# Patient Record
Sex: MALE | Race: WHITE | NOT HISPANIC OR LATINO
[De-identification: names, ages, dates, MRNs, and addresses within clinical notes are randomized per-mention and may not be internally consistent; named-entity substitution may affect disease eponyms.]

---

## 2017-11-21 PROBLEM — Z00.00 ENCOUNTER FOR PREVENTIVE HEALTH EXAMINATION: Status: ACTIVE | Noted: 2017-11-21

## 2017-11-28 ENCOUNTER — APPOINTMENT (OUTPATIENT)
Dept: ORTHOPEDIC SURGERY | Facility: CLINIC | Age: 37
End: 2017-11-28
Payer: COMMERCIAL

## 2017-11-28 VITALS
BODY MASS INDEX: 29.59 KG/M2 | WEIGHT: 238 LBS | SYSTOLIC BLOOD PRESSURE: 133 MMHG | HEART RATE: 62 BPM | DIASTOLIC BLOOD PRESSURE: 88 MMHG | HEIGHT: 75 IN

## 2017-11-28 DIAGNOSIS — Z87.39 PERSONAL HISTORY OF OTHER DISEASES OF THE MUSCULOSKELETAL SYSTEM AND CONNECTIVE TISSUE: ICD-10-CM

## 2017-11-28 DIAGNOSIS — Z87.898 PERSONAL HISTORY OF OTHER SPECIFIED CONDITIONS: ICD-10-CM

## 2017-11-28 DIAGNOSIS — Z60.2 PROBLEMS RELATED TO LIVING ALONE: ICD-10-CM

## 2017-11-28 DIAGNOSIS — M25.561 PAIN IN RIGHT KNEE: ICD-10-CM

## 2017-11-28 DIAGNOSIS — Z80.9 FAMILY HISTORY OF MALIGNANT NEOPLASM, UNSPECIFIED: ICD-10-CM

## 2017-11-28 PROCEDURE — 99204 OFFICE O/P NEW MOD 45 MIN: CPT

## 2017-11-28 PROCEDURE — 73564 X-RAY EXAM KNEE 4 OR MORE: CPT | Mod: RT

## 2017-11-28 SDOH — SOCIAL STABILITY - SOCIAL INSECURITY: PROBLEMS RELATED TO LIVING ALONE: Z60.2

## 2017-11-29 PROBLEM — Z87.39 HISTORY OF JOINT SWELLING: Status: RESOLVED | Noted: 2017-11-28 | Resolved: 2017-11-29

## 2017-11-29 PROBLEM — Z87.39 HISTORY OF JOINT STIFFNESS: Status: RESOLVED | Noted: 2017-11-28 | Resolved: 2017-11-29

## 2017-11-29 PROBLEM — M25.561 RIGHT KNEE PAIN: Status: ACTIVE | Noted: 2017-11-28

## 2017-11-29 PROBLEM — Z87.898 HISTORY OF WEAKNESS: Status: RESOLVED | Noted: 2017-11-28 | Resolved: 2017-11-29

## 2017-11-29 PROBLEM — Z87.39 HISTORY OF HERNIATED INTERVERTEBRAL DISC: Status: RESOLVED | Noted: 2017-11-28 | Resolved: 2017-11-29

## 2017-11-29 PROBLEM — Z80.9 FAMILY HISTORY OF MALIGNANT NEOPLASM: Status: ACTIVE | Noted: 2017-11-28

## 2017-11-29 RX ORDER — MULTIVITAMIN
TABLET ORAL
Refills: 0 | Status: ACTIVE | COMMUNITY

## 2017-11-29 RX ORDER — IBUPROFEN 800 MG
TABLET ORAL
Refills: 0 | Status: ACTIVE | COMMUNITY

## 2017-12-05 ENCOUNTER — MESSAGE (OUTPATIENT)
Age: 37
End: 2017-12-05

## 2017-12-19 ENCOUNTER — RESULT REVIEW (OUTPATIENT)
Age: 37
End: 2017-12-19

## 2017-12-29 ENCOUNTER — MESSAGE (OUTPATIENT)
Age: 37
End: 2017-12-29

## 2018-01-04 NOTE — ASU PATIENT PROFILE, ADULT - TEACHING/LEARNING LEARNING PREFERENCES
verbal instruction/individual instruction/audio individual instruction/verbal instruction/written material/audio/skill demonstration

## 2018-01-05 ENCOUNTER — APPOINTMENT (OUTPATIENT)
Dept: ORTHOPEDIC SURGERY | Facility: HOSPITAL | Age: 38
End: 2018-01-05

## 2018-01-05 ENCOUNTER — OUTPATIENT (OUTPATIENT)
Dept: OUTPATIENT SERVICES | Facility: HOSPITAL | Age: 38
LOS: 1 days | Discharge: HOME | End: 2018-01-05
Payer: COMMERCIAL

## 2018-01-05 ENCOUNTER — RESULT REVIEW (OUTPATIENT)
Age: 38
End: 2018-01-05

## 2018-01-05 VITALS
RESPIRATION RATE: 20 BRPM | DIASTOLIC BLOOD PRESSURE: 95 MMHG | OXYGEN SATURATION: 100 % | SYSTOLIC BLOOD PRESSURE: 138 MMHG | HEART RATE: 64 BPM | WEIGHT: 245.59 LBS | HEIGHT: 75 IN | TEMPERATURE: 98 F

## 2018-01-05 VITALS
SYSTOLIC BLOOD PRESSURE: 114 MMHG | DIASTOLIC BLOOD PRESSURE: 62 MMHG | TEMPERATURE: 97 F | HEART RATE: 68 BPM | OXYGEN SATURATION: 97 % | RESPIRATION RATE: 20 BRPM

## 2018-01-05 DIAGNOSIS — S83.231A COMPLEX TEAR OF MEDIAL MENISCUS, CURRENT INJURY, RIGHT KNEE, INITIAL ENCOUNTER: ICD-10-CM

## 2018-01-05 DIAGNOSIS — F41.1 GENERALIZED ANXIETY DISORDER: ICD-10-CM

## 2018-01-05 DIAGNOSIS — Z98.890 OTHER SPECIFIED POSTPROCEDURAL STATES: Chronic | ICD-10-CM

## 2018-01-05 DIAGNOSIS — S83.511A SPRAIN OF ANTERIOR CRUCIATE LIGAMENT OF RIGHT KNEE, INITIAL ENCOUNTER: ICD-10-CM

## 2018-01-05 DIAGNOSIS — J30.89 OTHER ALLERGIC RHINITIS: ICD-10-CM

## 2018-01-05 PROCEDURE — 29999 UNLISTED PX ARTHROSCOPY: CPT

## 2018-01-05 PROCEDURE — 29879 ARTHRS KNE SRG ABRASJ ARTHRP: CPT | Mod: RT

## 2018-01-05 PROCEDURE — 29881 ARTHRS KNE SRG MNISECTMY M/L: CPT | Mod: RT

## 2018-01-05 PROCEDURE — 29888 ARTHRS AID ACL RPR/AGMNTJ: CPT | Mod: RT

## 2018-01-05 PROCEDURE — 88304 TISSUE EXAM BY PATHOLOGIST: CPT

## 2018-01-05 RX ORDER — SODIUM CHLORIDE 9 MG/ML
1000 INJECTION, SOLUTION INTRAVENOUS
Qty: 0 | Refills: 0 | Status: DISCONTINUED | OUTPATIENT
Start: 2018-01-05 | End: 2018-01-05

## 2018-01-05 RX ORDER — ONDANSETRON 8 MG/1
4 TABLET, FILM COATED ORAL ONCE
Qty: 0 | Refills: 0 | Status: DISCONTINUED | OUTPATIENT
Start: 2018-01-05 | End: 2018-01-05

## 2018-01-05 RX ORDER — OXYCODONE AND ACETAMINOPHEN 5; 325 MG/1; MG/1
1 TABLET ORAL EVERY 4 HOURS
Qty: 0 | Refills: 0 | Status: DISCONTINUED | OUTPATIENT
Start: 2018-01-05 | End: 2018-01-05

## 2018-01-05 RX ORDER — OXYCODONE AND ACETAMINOPHEN 5; 325 MG/1; MG/1
2 TABLET ORAL EVERY 4 HOURS
Qty: 0 | Refills: 0 | Status: DISCONTINUED | OUTPATIENT
Start: 2018-01-05 | End: 2018-01-05

## 2018-01-05 RX ORDER — NABUMETONE 750 MG
1 TABLET ORAL
Qty: 14 | Refills: 0 | OUTPATIENT
Start: 2018-01-05 | End: 2018-01-11

## 2018-01-05 RX ORDER — MORPHINE SULFATE 50 MG/1
4 CAPSULE, EXTENDED RELEASE ORAL
Qty: 0 | Refills: 0 | Status: DISCONTINUED | OUTPATIENT
Start: 2018-01-05 | End: 2018-01-05

## 2018-01-05 NOTE — BRIEF OPERATIVE NOTE - PROCEDURE
<<-----Click on this checkbox to enter Procedure Right knee arthroscopy  01/05/2018  partial medial meniscectomy,  chondroplasty,  microfracture, ACL thermal shrinkage  Active  JOHNATHAN

## 2018-01-05 NOTE — CONSULT NOTE ADULT - SUBJECTIVE AND OBJECTIVE BOX
HPI:  37 year old male with complex tear of medial meniscus right knee and right ACL tear with moderate right knee pain and instability.  MRI confirms diagnosis.  Symptoms worse with twisting motions and stairs and exercise and persist over time.  Patient s/p right knee arthroscopy.    PAST MEDICAL & SURGICAL HISTORY:  allergic rhinits  Denies DM HBP PUD ASTHMA  History of surgery: right knee scope      REVIEW OF SYSTEMS    General:  malaise	  Skin/Breast: normal  Ophthalmologic: negative  ENMT:	normal  Respiratory and Thorax: normal  Cardiovascular:	normal  Gastrointestinal:	normal  Genitourinary:	normal  Musculoskeletal:	swelling   Neurological:	normal  Psychiatric:	normal  Hematology/Lymphatics:	 negative  Endocrine:	negative  Allergic/Immunologic:	negative      MEDICATIONS     none     Allergies    No Known Allergies    SOCIAL HISTORY: no cigs social alcohol    FAMILY HISTORY: non contributory      PHYSICAL EXAM:  Daily Height in cm: 190.5 (05 Jan 2018 07:29)      Vital Signs Last 24 Hrs  T(C): 36.7 (05 Jan 2018 07:29), Max: 36.7 (05 Jan 2018 07:29)  T(F): 98.1 (05 Jan 2018 07:29), Max: 98.1 (05 Jan 2018 07:29)  HR: 64 (05 Jan 2018 07:29) (64 - 64)  BP: 138/95 (05 Jan 2018 07:29) (138/95 - 138/95)  BP(mean): --  RR: 20 (05 Jan 2018 07:29) (20 - 20)  SpO2: 100% (05 Jan 2018 07:29) (100% - 100%)    Constitutional: WDWNM in NAD  Eyes: conj pink  ENMT: negative  Neck: supple  Breasts: not examined   Back: negative  Respiratory: clear to P&A  Cardiovascular: no MRGT or H  Gastrointestinal: normal bowel sounds  Genitourinary: neg  Rectal: not examined  Extremities:  normal  Vascular: normal  Neurological: normal  Skin: negative  Lymph Nodes: negative  Musculoskeletal:   decreased ROM  right knee and + lachman  Psychiatric: anxiety

## 2018-01-09 LAB — SURGICAL PATHOLOGY STUDY: SIGNIFICANT CHANGE UP

## 2018-01-30 ENCOUNTER — APPOINTMENT (OUTPATIENT)
Dept: ORTHOPEDIC SURGERY | Facility: CLINIC | Age: 38
End: 2018-01-30
Payer: COMMERCIAL

## 2018-01-30 VITALS — BODY MASS INDEX: 29.59 KG/M2 | HEIGHT: 75 IN | WEIGHT: 238 LBS

## 2018-01-30 DIAGNOSIS — M23.91 UNSPECIFIED INTERNAL DERANGEMENT OF RIGHT KNEE: ICD-10-CM

## 2018-01-30 PROCEDURE — 99024 POSTOP FOLLOW-UP VISIT: CPT

## 2018-03-13 ENCOUNTER — APPOINTMENT (OUTPATIENT)
Dept: ORTHOPEDIC SURGERY | Facility: CLINIC | Age: 38
End: 2018-03-13
Payer: COMMERCIAL

## 2018-03-13 VITALS — WEIGHT: 238 LBS | HEIGHT: 75 IN | BODY MASS INDEX: 29.59 KG/M2

## 2018-03-13 DIAGNOSIS — M23.8X1 OTHER INTERNAL DERANGEMENTS OF RIGHT KNEE: ICD-10-CM

## 2018-03-13 PROCEDURE — 99024 POSTOP FOLLOW-UP VISIT: CPT

## 2019-08-26 ENCOUNTER — EMERGENCY (EMERGENCY)
Facility: HOSPITAL | Age: 39
LOS: 1 days | Discharge: HOME | End: 2019-08-26
Admitting: EMERGENCY MEDICINE
Payer: COMMERCIAL

## 2019-08-26 VITALS
HEIGHT: 75 IN | RESPIRATION RATE: 18 BRPM | OXYGEN SATURATION: 98 % | TEMPERATURE: 98 F | WEIGHT: 259.7 LBS | HEART RATE: 83 BPM | SYSTOLIC BLOOD PRESSURE: 150 MMHG | DIASTOLIC BLOOD PRESSURE: 97 MMHG

## 2019-08-26 DIAGNOSIS — Z98.890 OTHER SPECIFIED POSTPROCEDURAL STATES: Chronic | ICD-10-CM

## 2019-08-26 PROCEDURE — 99283 EMERGENCY DEPT VISIT LOW MDM: CPT

## 2019-08-26 RX ORDER — CIPROFLOXACIN AND DEXAMETHASONE 3; 1 MG/ML; MG/ML
4 SUSPENSION/ DROPS AURICULAR (OTIC)
Qty: 1 | Refills: 0
Start: 2019-08-26 | End: 2019-09-01

## 2019-08-26 NOTE — ED PROVIDER NOTE - CARE PROVIDER_API CALL
Rona Tabor)  Otolaryngology  [address] [place] [address], [address]  [place], [place] [address]  Phone: [phone]  Fax: [phone]  Follow Up Time:

## 2019-08-26 NOTE — ED PROVIDER NOTE - NSFOLLOWUPINSTRUCTIONS_ED_ALL_ED_FT
Log Out.    [employer]® CareNotes®     :  [facility]             OTITIS EXTERNA - AfterCare(R) Instructions(ER/ED)     Otitis Externa    WHAT YOU NEED TO KNOW:    Otitis externa, or swimmer's ear, is an infection in the outer ear canal. This canal goes from the outside of the ear to the eardrum. Ear Anatomy         DISCHARGE INSTRUCTIONS:    Return to the emergency department if:     You have severe ear pain.      You are suddenly unable to hear at all.      You have new swelling in your face, behind your ears, or in your neck.      You suddenly cannot move part of your face.      Your face suddenly feels numb.    Contact your healthcare provider if:     You have a fever.       Your signs and symptoms do not get better after 2 days of treatment.       Your signs and symptoms go away for a time, but then come back.       You have questions or concerns about your condition or care.     Medicines:     NSAIDs, such as ibuprofen, help decrease swelling, pain, and fever. This medicine is available with or without a doctor's order. NSAIDs can cause stomach bleeding or kidney problems in certain people. If you take blood thinner medicine, always ask if NSAIDs are safe for you. Always read the medicine label and follow directions. Do not give these medicines to children under 6 months of age without direction from your child's healthcare provider.      Acetaminophen decreases pain and fever. It is available without a doctor's order. Ask how much to take and how often to take it. Follow directions. Acetaminophen can cause liver damage if not taken correctly.      Ear drops that contain an antibiotic may be given. The antibiotic helps treat a bacterial infection. You may also be given steroid medicine. The steroid helps decrease redness, swelling, and pain.       Take your medicine as directed. Contact your healthcare provider if you think your medicine is not helping or if you have side effects. Tell him or her if you are allergic to any medicine. Keep a list of the medicines, vitamins, and herbs you take. Include the amounts, and when and why you take them. Bring the list or the pill bottles to follow-up visits. Carry your medicine list with you in case of an emergency.    Follow up with your healthcare provider as directed: Write down your questions so you remember to ask them during your visits.    How to use eardrops:     Lie down on your side with your infected ear facing up.      Carefully drip the correct number of eardrops into your ear. Have another person help you if possible.      Gently move the outside part of your ear back and forth to help the medicine reach your ear canal.       Stay lying down in the same position (with your ear facing up) for 3 to 5 minutes.     Prevent otitis externa:     Do not put cotton swabs or foreign objects in your ears.      Wrap a clean moist washcloth around your finger, and use it to clean your outer ear and remove extra ear wax.       Use ear plugs when you swim. Dry your outer ears completely after you swim or bathe.         © Copyright [employer] 2019 All illustrations and images included in CareNotes are the copyrighted property of [employer] or [employer].      back to top                      © Copyright [employer] 2019 please use ear drops as prescribed    please have re-check of your ear in 2-3 days by ENT      please take ibuprofen for pain as needed    OTITIS EXTERNA - AfterCare(R) Instructions(ER/ED)     Otitis Externa    WHAT YOU NEED TO KNOW:    Otitis externa, or swimmer's ear, is an infection in the outer ear canal. This canal goes from the outside of the ear to the eardrum. Ear Anatomy         DISCHARGE INSTRUCTIONS:    Return to the emergency department if:     You have severe ear pain.      You are suddenly unable to hear at all.      You have new swelling in your face, behind your ears, or in your neck.      You suddenly cannot move part of your face.      Your face suddenly feels numb.    Contact your healthcare provider if:     You have a fever.       Your signs and symptoms do not get better after 2 days of treatment.       Your signs and symptoms go away for a time, but then come back.       You have questions or concerns about your condition or care.     Medicines:     NSAIDs, such as ibuprofen, help decrease swelling, pain, and fever. This medicine is available with or without a doctor's order. NSAIDs can cause stomach bleeding or kidney problems in certain people. If you take blood thinner medicine, always ask if NSAIDs are safe for you. Always read the medicine label and follow directions. Do not give these medicines to children under 6 months of age without direction from your child's healthcare provider.      Acetaminophen decreases pain and fever. It is available without a doctor's order. Ask how much to take and how often to take it. Follow directions. Acetaminophen can cause liver damage if not taken correctly.      Ear drops that contain an antibiotic may be given. The antibiotic helps treat a bacterial infection. You may also be given steroid medicine. The steroid helps decrease redness, swelling, and pain.       Take your medicine as directed. Contact your healthcare provider if you think your medicine is not helping or if you have side effects. Tell him or her if you are allergic to any medicine. Keep a list of the medicines, vitamins, and herbs you take. Include the amounts, and when and why you take them. Bring the list or the pill bottles to follow-up visits. Carry your medicine list with you in case of an emergency.    Follow up with your healthcare provider as directed: Write down your questions so you remember to ask them during your visits.    How to use eardrops:     Lie down on your side with your infected ear facing up.      Carefully drip the correct number of eardrops into your ear. Have another person help you if possible.      Gently move the outside part of your ear back and forth to help the medicine reach your ear canal.       Stay lying down in the same position (with your ear facing up) for 3 to 5 minutes.     Prevent otitis externa:     Do not put cotton swabs or foreign objects in your ears.      Wrap a clean moist washcloth around your finger, and use it to clean your outer ear and remove extra ear wax.       Use ear plugs when you swim. Dry your outer ears completely after you swim or bathe.         © Copyright [employer] 2019 All illustrations and images included in CareNotes are the copyrighted property of [employer] or [employer].      back to top                      © Copyright [employer] 2019

## 2019-08-26 NOTE — ED ADULT NURSE NOTE - OBJECTIVE STATEMENT
Pt presents to ED with c/o L ear pain, redness, swelling with "muffled hearing" and clear drainage x1 day. Denies fever/chills.

## 2019-08-26 NOTE — ED PROVIDER NOTE - PHYSICAL EXAMINATION
General survey: Patient is well developed and well nourished. Patient is lying in stretcher, not diaphoretic and does not appear in acute distress.    HEENT: left eac severely edematous, erythematous with clear discharge, unable to visualize TM. Pupils equal, round and reactive to light and accommodation. Extra ocular movements intact. No evidence of nystagmus, conjunctival injection or jaundice. Nose symmetric, non-tender without discharge. Nares muscosa moist without evidence of erythema. Moist mucous membranes of oropharyx. No evidence of erythema, edema, petichiae, exudates or tonsillar enlargement. Oropharynx moist mucous membranes. Teeth in good repair. Uvula midline. Posterior oropharynx without erythema, edema, tonsillar enlargement or exudates. Neck supple without evidence of lymphadenopathy    Skin: Warm dry and intact. No note of any edema, pallor, jaundice, erythema, ecchymosis, or purpura    Psych: Mood and affect appropriate

## 2019-08-26 NOTE — ED PROVIDER NOTE - CLINICAL SUMMARY MEDICAL DECISION MAKING FREE TEXT BOX
This is a pleasant 38 year old male presenting to the ed with left ear swelling and decreased hearing over the past 4 days with significant worsening over the psat 24 hours without fevers or chills. physical exam patient appears well, non-toxic. left eac edematous and ertyhematous and occluded. no mastoid or tragal ttp. ent consulted. This is a pleasant 38 year old male presenting to the ed with left ear swelling and decreased hearing over the past 4 days with significant worsening over the psat 24 hours without fevers or chills. physical exam patient appears well, non-toxic. left eac edematous and erythematous and occluded. no mastoid or tragal ttp. ent consulted who placed ear wick, encouraged to use ciprodex and follow up in 2 days for removal and re-check by ENT. pt agreeable to plan. ED evaluation and management discussed with the patient and family (if available) in detail.  Close PMD follow up encouraged.  Strict ED return instructions discussed in detail and patient given the opportunity to ask any questions about their discharge diagnosis and instructions. Patient verbalized understanding. Patient is agreeable to plan.

## 2019-08-26 NOTE — ED ADULT NURSE NOTE - NSIMPLEMENTINTERV_GEN_ALL_ED
Implemented All Universal Safety Interventions:  [place] to call system. Call bell, personal items and telephone within reach. Instruct patient to call for assistance. Room bathroom lighting operational. Non-slip footwear when patient is off stretcher. Physically safe environment: no spills, clutter or unnecessary equipment. Stretcher in lowest position, wheels locked, appropriate side rails in place.

## 2019-08-26 NOTE — ED ADULT TRIAGE NOTE - CHIEF COMPLAINT QUOTE
c/o left earache with clear drain since Sunday with decreased hearing. Denies fever, chills, headache

## 2019-08-26 NOTE — ED PROVIDER NOTE - OBJECTIVE STATEMENT
states that it began over the past 3-4 days. no fevers or chillss. states that earlier today he noticed "the ear popping and some clear discharge". no pain, described as a 2. has not been taking any medication. no injury to the ear, use of q tips or recent swimmiung. states that the ear feels "swollen and I cant hear as well". no nasal congestion discharge or sore throat. states that it began over the past 3-4 days. no fevers or chillss. states that earlier today he noticed "the ear popping and some clear discharge". no pain, described as a 2. has not been taking any medication. no injury to the ear, use of q tips or recent swimming. states that the ear feels "swollen and I cant hear as well". no nasal congestion discharge or sore throat.

## 2019-08-28 ENCOUNTER — APPOINTMENT (OUTPATIENT)
Dept: OTOLARYNGOLOGY | Facility: CLINIC | Age: 39
End: 2019-08-28
Payer: COMMERCIAL

## 2019-08-28 VITALS
DIASTOLIC BLOOD PRESSURE: 89 MMHG | SYSTOLIC BLOOD PRESSURE: 131 MMHG | OXYGEN SATURATION: 98 % | HEIGHT: 75 IN | HEART RATE: 63 BPM | BODY MASS INDEX: 30.46 KG/M2 | WEIGHT: 245 LBS

## 2019-08-28 PROCEDURE — 69210 REMOVE IMPACTED EAR WAX UNI: CPT | Mod: RT

## 2019-08-28 PROCEDURE — 99204 OFFICE O/P NEW MOD 45 MIN: CPT | Mod: 25

## 2019-08-28 NOTE — ASSESSMENT
[FreeTextEntry1] : The patient is a 38 male who presents with concern for left-sided otalgia and otorrhea. History physical exam is consistent with otitis externa. Today the wick was removed and the ear was debrided. It is now back to baseline. I recommend continue use of Ciprodex solution for a total of 10 days. The patient will follow up with me in 2 weeks, sooner should symptoms worsen or failed to improve. Additionally the right ear was cleaned of cerumen.\par \par -Ciprodex otic solution to the left ear it for a total of 10 days\par -Followup in 2 weeks, sooner should symptoms worsen or failed to improve

## 2019-08-28 NOTE — PROCEDURE
[Risk and Benefits Discussed] : The purpose, risks, discomforts, benefits and alternatives of the procedure have been explained to the patient including no treatment. [Same] : same as the Pre Op Dx. [Other ___] : [unfilled] [] : Removal of Cerumen [FreeTextEntry4] : none [FreeTextEntry5] : left-sided ear debridement [FreeTextEntry6] : curette and suction was used to remove cerumen from the right EAC. The patient tolerated this well\par Curette and suction were used to remove a week as well as purulent debris from the left EAC. The patient tolerated this well.

## 2019-08-28 NOTE — REASON FOR VISIT
[Initial Consultation] : an initial consultation for [Ear Drainage] : ear drainage [Ear Pain] : ear pain

## 2019-08-28 NOTE — PHYSICAL EXAM
[Midline] : trachea located in midline position [Normal] : no rashes [de-identified] : left-sided ear wick was removed. There was evidence of purulent debris in the EAC. This was suctioned away. The EAC appear to be at baseline and the tympanic membrane intact.  the right ear had a mild amount of cerumen which was also removed, the ear was otherwise normal

## 2019-08-28 NOTE — HISTORY OF PRESENT ILLNESS
[de-identified] : the patient is a 38-year-old gentleman who presents with concern for left-sided otalgia and otorrhea.The patient states that his symptoms began one week ago. He progressively has gotten worse. He also noted some muffled hearing. He was ultimately seen that h emergency room on 826 2019. While there he was diagnosed with a left-sided otitis externa. A wick was placed in the EAC. The patient was started on Ciprodex otic solution. He has been using the drops.  He does not use Q-tips. He has not been in lakes or pools. nothing like this has happened previously.\par \par The patient otherwise denies any ear, nose, throat symptoms.

## 2019-08-30 DIAGNOSIS — H92.02 OTALGIA, LEFT EAR: ICD-10-CM

## 2019-08-30 DIAGNOSIS — H60.502 UNSPECIFIED ACUTE NONINFECTIVE OTITIS EXTERNA, LEFT EAR: ICD-10-CM

## 2019-09-11 ENCOUNTER — APPOINTMENT (OUTPATIENT)
Dept: OTOLARYNGOLOGY | Facility: CLINIC | Age: 39
End: 2019-09-11
Payer: COMMERCIAL

## 2019-09-11 VITALS
BODY MASS INDEX: 30.46 KG/M2 | HEART RATE: 88 BPM | HEIGHT: 75 IN | OXYGEN SATURATION: 98 % | SYSTOLIC BLOOD PRESSURE: 137 MMHG | WEIGHT: 245 LBS | DIASTOLIC BLOOD PRESSURE: 84 MMHG

## 2019-09-11 PROCEDURE — 99213 OFFICE O/P EST LOW 20 MIN: CPT | Mod: 25

## 2019-09-11 PROCEDURE — 69210 REMOVE IMPACTED EAR WAX UNI: CPT | Mod: RT

## 2019-09-11 NOTE — ASSESSMENT
[FreeTextEntry1] : The patient is a 38-year-old gentleman who is known to me for a left-sided otitis externa. He has done well with Ciprodex otic solution to the left ear. At this point he feels asymptomatic. Both ears were cleaned in the office and the patient is now back to his baseline. At this time I recommend followup as needed.\par \par -Follow up as needed

## 2019-09-11 NOTE — HISTORY OF PRESENT ILLNESS
[de-identified] : the patient is a 38-year-old gentleman who presents with concern for left-sided otalgia and otorrhea.The patient states that his symptoms began one week ago. He progressively has gotten worse. He also noted some muffled hearing. He was ultimately seen that h emergency room on 826 2019. While there he was diagnosed with a left-sided otitis externa. A wick was placed in the EAC. The patient was started on Ciprodex otic solution. He has been using the drops.  He does not use Q-tips. He has not been in lakes or pools. nothing like this has happened previously.\par \par The patient otherwise denies any ear, nose, throat symptoms. [FreeTextEntry1] : uptake 9/11/2019\par Since her last visit the patient has been doing remarkably well. He's been using Ciprodex otic solution on the left.  He no longer has any pain or itchiness in his ears. He feels that his hearing is back to baseline. He denies any otologic symptoms otherwise. He denies any new ear, nose, throat symptoms.

## 2019-09-11 NOTE — PHYSICAL EXAM
[Midline] : trachea located in midline position [Normal] : no rashes [de-identified] : evidence of somewhat cerumen on the right evidence of some residual macerated skin on the left, once cleaned away both EACs appeared normal and back to baseline

## 2019-10-02 PROBLEM — Z60.2 PERSON LIVING ALONE: Status: ACTIVE | Noted: 2017-11-28

## 2020-01-10 NOTE — ED ADULT NURSE NOTE - PAIN RATING/NUMBER SCALE (0-10): ACTIVITY
Anesthesia Transfer of Care Note    Patient: Jacques Thomson III    Procedure(s) Performed: Procedure(s) (LRB):  ADENOIDECTOMY (N/A)    Patient location: PACU    Anesthesia Type: general    Transport from OR: Transported from OR on room air with adequate spontaneous ventilation    Post pain: adequate analgesia    Post assessment: no apparent anesthetic complications and tolerated procedure well    Post vital signs: stable    Level of consciousness: awake, alert and oriented    Nausea/Vomiting: no nausea/vomiting    Complications: none    Transfer of care protocol was followed      Last vitals:   Visit Vitals  /71   Pulse 87   Temp 37.2 °C (99 °F) (Temporal)   Resp 16   Wt 105.2 kg (231 lb 14.8 oz)   SpO2 95%     
2

## 2020-07-10 ENCOUNTER — APPOINTMENT (OUTPATIENT)
Dept: OTOLARYNGOLOGY | Facility: CLINIC | Age: 40
End: 2020-07-10
Payer: COMMERCIAL

## 2020-07-10 VITALS
HEART RATE: 62 BPM | WEIGHT: 245 LBS | BODY MASS INDEX: 30.46 KG/M2 | DIASTOLIC BLOOD PRESSURE: 95 MMHG | HEIGHT: 75 IN | SYSTOLIC BLOOD PRESSURE: 134 MMHG

## 2020-07-10 DIAGNOSIS — H92.01 OTALGIA, RIGHT EAR: ICD-10-CM

## 2020-07-10 DIAGNOSIS — H61.21 IMPACTED CERUMEN, RIGHT EAR: ICD-10-CM

## 2020-07-10 PROCEDURE — 99213 OFFICE O/P EST LOW 20 MIN: CPT | Mod: 25

## 2020-07-10 PROCEDURE — 69210 REMOVE IMPACTED EAR WAX UNI: CPT | Mod: RT

## 2020-07-10 NOTE — HISTORY OF PRESENT ILLNESS
[de-identified] : 39m, previously seen by Dr. savage for OE and cerumen impaction, returns for routine follow up of cerumen. He denies any anne-marie changes, tinnitus, otalgia, otorrhea, vertigo. No other EN complaints. No pertinent FH/Sh.

## 2020-07-10 NOTE — ASSESSMENT
[FreeTextEntry1] : 39M who presents with cerumen impaction of the right ear, removed in office.\par \par Plan:\par - cerumen removed\par - f/u in 6 months

## 2020-11-01 DIAGNOSIS — Z01.818 ENCOUNTER FOR OTHER PREPROCEDURAL EXAMINATION: ICD-10-CM

## 2020-11-01 NOTE — ASU PATIENT PROFILE, ADULT - NSSUBSTANCEUSE_GEN_ALL_CORE_SD
Pt discharged. Discharge explained, verbalized understanding. IV removed, cath intact. Waiting on transport. Will continue to monitor.   caffeine

## 2020-11-03 ENCOUNTER — APPOINTMENT (OUTPATIENT)
Dept: DISASTER EMERGENCY | Facility: CLINIC | Age: 40
End: 2020-11-03

## 2020-11-04 LAB — SARS-COV-2 N GENE NPH QL NAA+PROBE: NOT DETECTED

## 2022-04-25 ENCOUNTER — APPOINTMENT (OUTPATIENT)
Dept: OTOLARYNGOLOGY | Facility: CLINIC | Age: 42
End: 2022-04-25
Payer: COMMERCIAL

## 2022-04-25 VITALS
SYSTOLIC BLOOD PRESSURE: 169 MMHG | HEIGHT: 75 IN | OXYGEN SATURATION: 98 % | TEMPERATURE: 97.3 F | HEART RATE: 86 BPM | DIASTOLIC BLOOD PRESSURE: 105 MMHG | BODY MASS INDEX: 30.46 KG/M2 | WEIGHT: 245 LBS

## 2022-04-25 DIAGNOSIS — H60.312 DIFFUSE OTITIS EXTERNA, LEFT EAR: ICD-10-CM

## 2022-04-25 PROCEDURE — 69210 REMOVE IMPACTED EAR WAX UNI: CPT

## 2022-04-25 PROCEDURE — 99214 OFFICE O/P EST MOD 30 MIN: CPT | Mod: 25

## 2022-04-25 RX ORDER — OFLOXACIN OTIC 3 MG/ML
0.3 SOLUTION AURICULAR (OTIC) TWICE DAILY
Qty: 1 | Refills: 3 | Status: ACTIVE | COMMUNITY
Start: 2022-04-25 | End: 1900-01-01

## 2022-04-25 RX ORDER — DEXAMETHASONE SODIUM PHOSPHATE 1 MG/ML
0.1 SOLUTION/ DROPS OPHTHALMIC TWICE DAILY
Qty: 2 | Refills: 2 | Status: ACTIVE | COMMUNITY
Start: 2022-04-25 | End: 1900-01-01

## 2022-04-25 RX ORDER — CIPROFLOXACIN AND DEXAMETHASONE 3; 1 MG/ML; MG/ML
0.3-0.1 SUSPENSION/ DROPS AURICULAR (OTIC)
Qty: 1 | Refills: 2 | Status: ACTIVE | COMMUNITY
Start: 2022-04-25 | End: 1900-01-01

## 2022-04-25 NOTE — PROCEDURE
[FreeTextEntry3] : -\par Cerumen Removal/Ear Cleaning for Otitis Externa\par Pre-operative Diagnosis: bilateral Cerumen Impaction\par Post-operative Diagnosis: Same\par Procedure:  Binocular microscopy with cerumen removal- 43592\par Procedure Details:  \par The patient was placed in the supine position.  The operating microscope was positioned.  I then placed the ear speculum in the EAC.  Cerumen was then removed using a mixture of otologic curettes, and suction.  The TM was noted to be intact. I then performed the procedure of the opposite ear in similar fashion.  The patient tolerated procedure well.\par \par Findings: \par Bilateral Ear Canal - normal\par Bilateral Tympanic Membrane - normal\par \par Recommendations: Debrox\par Complications: None\par \par

## 2022-04-25 NOTE — ASSESSMENT
[FreeTextEntry1] :   38-year-old male who presents for routine ear cleaning.  On examination today there was evidence of bilateral cerumen impaction which was cleaned away.  On the left side there was also some noted debris.  This was cleaned away as well.  I suspect there may be some level of otitis externa.  I am recommending steroid and antibiotic otic drops to the left side and follow-up in 10 days for repeat evaluation.  Otitis externa handout also given.\par \par -   Debrox to the right ear\par -  Ciprodex to the left ear x10 days\par -  follow-up after treatment, sooner should symptoms worsen or fail to improve

## 2022-04-25 NOTE — PHYSICAL EXAM
[de-identified] :   Bilateral cerumen noted, there is also some debris noted on the left. [Normal] : mucosa is normal [Midline] : trachea located in midline position

## 2022-04-25 NOTE — HISTORY OF PRESENT ILLNESS
[FreeTextEntry1] :  update 4/25/2022\par No significant change in symptoms.  He presents today for routine ear cleaning.  He notes some minor irritation on the left.  No new ear, nose, throat symptoms otherwise. [de-identified] : the patient is a 38-year-old gentleman who presents with concern for left-sided otalgia and otorrhea.The patient states that his symptoms began one week ago. He progressively has gotten worse. He also noted some muffled hearing. He was ultimately seen that h emergency room on 826 2019. While there he was diagnosed with a left-sided otitis externa. A wick was placed in the EAC. The patient was started on Ciprodex otic solution. He has been using the drops.  He does not use Q-tips. He has not been in lakes or pools. nothing like this has happened previously.\par \par The patient otherwise denies any ear, nose, throat symptoms.\par -\par uptake 9/11/2019\par Since her last visit the patient has been doing remarkably well. He's been using Ciprodex otic solution on the left.  He no longer has any pain or itchiness in his ears. He feels that his hearing is back to baseline. He denies any otologic symptoms otherwise. He denies any new ear, nose, throat symptoms.\par -

## 2022-11-25 NOTE — ASU PREOP CHECKLIST - DENTURES
Subjective   Patient ID: Berenice is a 35 year old male.    Chief Complaint   Patient presents with   • Office Visit   • Cough   • Sore Throat     For 3 days cough up thick yellow mucous. Denies fever. Today right eye with drainage.     HPI  This is a 35-year-old male coming in with complaints of a sore throat has had a cough with thick yellow mucus for the last 3 days, no fever, no chills, today also with some right eye discharge and drainage.  Patient is otherwise healthy.  Not immunocompromised  No prior history of pneumonias, no shortness of breath or difficulty breathing.  Otherwise healthy, no prior history of pneumonias.  The right eyes started bothering him only last night, notes that he was coughing excessively and could not get a good night sleep.  Denies any fever or chills, has had multiple ear infections in the past has seen some discharge from out of the left ear but did not make much of it.  History of ear infections and eardrum ruptures as a child.    History reviewed. No pertinent past medical history.    MEDICATIONS:  Current Outpatient Medications   Medication Sig   • amoxicillin-clavulanate (AUGMENTIN) 875-125 MG per tablet Take 1 tablet by mouth in the morning and 1 tablet in the evening. Do all this for 10 days.   • ciprofloxacin (Ciloxan) 0.3 % ophthalmic solution Place 2 drops into right eye every 8 hours for 5 days.   • guaiFENesin-codeine (GUAIFENESIN AC) 100-10 MG/5ML liquid Take 10 mLs by mouth nightly as needed for Cough.     No current facility-administered medications for this visit.       ALLERGIES:  ALLERGIES:   Allergen Reactions   • Aleve RASH   • Shellfish Allergy   (Food Or Med) RASH       PAST SURGICAL HISTORY:  History reviewed. No pertinent surgical history.    FAMILY HISTORY:  History reviewed. No pertinent family history.    SOCIAL HISTORY:  Social History     Tobacco Use   • Smoking status: Never Smoker   • Smokeless tobacco: Never Used         Patient's medications,  allergies, past medical, surgical, and social history  were reviewed and updated as appropriate.    A 10 point review of systems was performed all of which are negative except as mentioned in the HPI    Objective   Physical Exam  Visit Vitals  /70   Pulse 91   Temp 97 °F (36.1 °C)   Resp 16   SpO2 99%       GENERAL: well developed, well nourished,in no apparent distress  SKIN: no rashes,no suspicious lesions  Eyes: Conjunctiva of the right eye is erythematous, pupils equally reactive to light bilaterally, extraocular moments intact bilaterally, discharge noted, mattering of eyelashes noted  HEENT: atraumatic, normocephalic,throat are clear, left ear-?  Perforation, there is a lot of discharge within the ear canal with weeping noted  NECK: supple, mild anterior cervical lymphadenopathy noted bilaterally  LUNGS: clear to auscultation, good respiratory effort   CARDIO: RRR without murmur  GI: not distended   NEURO: Alert and oriented to person, place and time   EXTREMITIES: no cyanosis, clubbing or edema          Assessment   Problem List Items Addressed This Visit    None     Visit Diagnoses     Left otitis media with spontaneous rupture of eardrum    -  Primary    Relevant Medications    amoxicillin-clavulanate (AUGMENTIN) 875-125 MG per tablet    Sore throat        Relevant Orders    POCT RAPID STREP A (Completed)    POCT SARS-COV-2 ANTIGEN/FLU ANTIGEN PANEL (Completed)    Acute bacterial conjunctivitis of right eye        Relevant Medications    ciprofloxacin (Ciloxan) 0.3 % ophthalmic solution    Post-nasal drip        Acute cough        Relevant Medications    guaiFENesin-codeine (GUAIFENESIN AC) 100-10 MG/5ML liquid      And has no signs of toxicity, decompensation or dehydration at this time.  Stable at the time of discharge.    This is a 35 year old year-old male who presents with diagnoses as above.  Plan of care as discussed below      Both rapid strep and viral panel were negative     Tylenol 1 g  every 6-8 hours as needed for pain  Or  Motrin 600 mg every 6 hours as needed for pain   Rx Augmentin 875 mg twice daily for the next 10 days  Probiotic-over-the-counter Culturelle/Florastor recommended to avoid any GI distress from the antibiotic     Ciprofloxacin eyedrops 2 drops every 8 hours into the affected eye complete the course for 5 days.  Avoid wearing contact lenses or rubbing the eye.    Instructions provided as documented in the AVS.    Thank you for visiting [facility].  Please follow up with your PCP In the next 3 to 5 days as needed.   no

## 2023-07-13 ENCOUNTER — APPOINTMENT (OUTPATIENT)
Dept: OTOLARYNGOLOGY | Facility: CLINIC | Age: 43
End: 2023-07-13
Payer: COMMERCIAL

## 2023-07-13 VITALS
HEART RATE: 101 BPM | SYSTOLIC BLOOD PRESSURE: 153 MMHG | HEIGHT: 75 IN | TEMPERATURE: 97.7 F | BODY MASS INDEX: 29.84 KG/M2 | WEIGHT: 240 LBS | DIASTOLIC BLOOD PRESSURE: 98 MMHG

## 2023-07-13 PROCEDURE — 99214 OFFICE O/P EST MOD 30 MIN: CPT

## 2023-07-13 NOTE — HISTORY OF PRESENT ILLNESS
[de-identified] : \par The patient is a 38-year-old gentleman who presents with concern for left-sided otalgia and otorrhea.The patient states that his symptoms began one week ago. He progressively has gotten worse. He also noted some muffled hearing. He was ultimately seen that h emergency room on 826 2019. While there he was diagnosed with a left-sided otitis externa. A wick was placed in the EAC. The patient was started on Ciprodex otic solution. He has been using the drops. He does not use Q-tips. He has not been in lakes or pools. nothing like this has happened previously.\par \par The patient otherwise denies any ear, nose, throat symptoms.\par -\par uptake 9/11/2019\par Since her last visit the patient has been doing remarkably well. He's been using Ciprodex otic solution on the left. He no longer has any pain or itchiness in his ears. He feels that his hearing is back to baseline. He denies any otologic symptoms otherwise. He denies any new ear, nose, throat symptoms.\par - \par Interval History: update 4/25/2022\par No significant change in symptoms. He presents today for routine ear cleaning. He notes some minor irritation on the left. No new ear, nose, throat symptoms otherwise. \par  \par - [FreeTextEntry1] : 7/13/23\par Went to an Urgent Care in [facility] after having some ear fullness.  He was dx with OM, and given abx and steroid.  After treatment symptoms persisted.  Seen by an ENT and given medrol/amoxcillin for 7 days and now feels better.  Had an audio on a repeat

## 2023-07-13 NOTE — ASSESSMENT
[FreeTextEntry1] : 42-year-old gentleman who is well-known to me for ear issues in the past including otitis externa and cerumen.  Today presents with a recent diagnosis of otitis media as well as asymmetric hearing loss by an outside ENT.  Exam today is normal.  Patient feels that he is back to his baseline.  Today we reviewed the audiogram from the outside ENT and there was evidence of a slight asymmetry.  At this time I am recommending a repeat audiogram and tympanogram as well as ABR to rule out a retrocochlear process.  Patient will have this completed and then follow-up after to review those results.\par \par –Audiogram, tympanogram, ABR\par – Follow-up after the above, sooner should symptoms worsen or fail to improve

## 2023-07-13 NOTE — PROCEDURE
[FreeTextEntry3] : -\par Cerumen Removal/Ear Cleaning for Otitis Externa\par Pre-operative Diagnosis: bilateral Cerumen Impaction\par Post-operative Diagnosis: Same\par Procedure: Binocular microscopy with cerumen removal- 16914\par Procedure Details: \par The patient was placed in the supine position. The operating microscope was positioned. I then placed the ear speculum in the EAC. Cerumen was then removed using a mixture of otologic curettes, and suction. The TM was noted to be intact. I then performed the procedure of the opposite ear in similar fashion. The patient tolerated procedure well.\par \par Findings: \par Bilateral Ear Canal - normal\par Bilateral Tympanic Membrane - normal\par \par Recommendations: Debrox\par Complications: None\par \par

## 2023-08-10 ENCOUNTER — APPOINTMENT (OUTPATIENT)
Dept: OTOLARYNGOLOGY | Facility: CLINIC | Age: 43
End: 2023-08-10

## 2023-08-10 NOTE — HISTORY OF PRESENT ILLNESS
[de-identified] : The patient is a 38-year-old gentleman who presents with concern for left-sided otalgia and otorrhea.The patient states that his symptoms began one week ago. He progressively has gotten worse. He also noted some muffled hearing. He was ultimately seen that h emergency room on 826 2019. While there he was diagnosed with a left-sided otitis externa. A wick was placed in the EAC. The patient was started on Ciprodex otic solution. He has been using the drops. He does not use Q-tips. He has not been in lakes or pools. nothing like this has happened previously.    The patient otherwise denies any ear, nose, throat symptoms.  -  uptake 9/11/2019  Since her last visit the patient has been doing remarkably well. He's been using Ciprodex otic solution on the left. He no longer has any pain or itchiness in his ears. He feels that his hearing is back to baseline. He denies any otologic symptoms otherwise. He denies any new ear, nose, throat symptoms.  -  Interval History: update 4/25/2022  No significant change in symptoms. He presents today for routine ear cleaning. He notes some minor irritation on the left. No new ear, nose, throat symptoms otherwise.    -   Interval History: 7/13/23  Went to an Urgent Care in [facility] after having some ear fullness. He was dx with OM, and given abx and steroid. After treatment symptoms persisted. Seen by an ENT and given medrol/amoxcillin for 7 days and now feels better. Had an audio on a repeat. - [FreeTextEntry1] : 8/10/23 Presents today to review audio/tymp and ABR. Symptoms unchanged from previous. No new ENT issues otherwise.

## 2023-08-10 NOTE — ASSESSMENT
[FreeTextEntry1] : 42-year-old gentleman who is well-known to me for ear issues in the past including otitis externa and cerumen. Today presents with a recent diagnosis of otitis media as well as asymmetric hearing loss by an outside ENT. Exam today is normal. Patient feels that he is back to his baseline. Today we reviewed the audiogram from the outside ENT and there was evidence of a slight asymmetry. At this time I am recommending a repeat audiogram and tympanogram as well as ABR to rule out a retrocochlear process. Patient will have this completed and then follow-up after to review those results.    -Audiogram, tympanogram, ABR  - Follow-up after the above, sooner should symptoms worsen or fail to improve.

## 2024-03-19 ENCOUNTER — APPOINTMENT (OUTPATIENT)
Dept: OTOLARYNGOLOGY | Facility: CLINIC | Age: 44
End: 2024-03-19
Payer: COMMERCIAL

## 2024-03-19 DIAGNOSIS — H90.3 SENSORINEURAL HEARING LOSS, BILATERAL: ICD-10-CM

## 2024-03-19 DIAGNOSIS — H61.23 IMPACTED CERUMEN, BILATERAL: ICD-10-CM

## 2024-03-19 PROCEDURE — 69210 REMOVE IMPACTED EAR WAX UNI: CPT

## 2024-03-19 PROCEDURE — 99214 OFFICE O/P EST MOD 30 MIN: CPT | Mod: 25

## 2024-03-19 NOTE — PHYSICAL EXAM
[Normal] : tympanic membranes are normal in both ears [de-identified] : bilateral cerumen impaction- cleaned away with suction/curette with no issues.

## 2024-03-19 NOTE — PROCEDURE
[FreeTextEntry3] : -\par  Cerumen Removal/Ear Cleaning for Otitis Externa\par  Pre-operative Diagnosis: bilateral Cerumen Impaction\par  Post-operative Diagnosis: Same\par  Procedure: Binocular microscopy with cerumen removal- 88290\par  Procedure Details: \par  The patient was placed in the supine position. The operating microscope was positioned. I then placed the ear speculum in the EAC. Cerumen was then removed using a mixture of otologic curettes, and suction. The TM was noted to be intact. I then performed the procedure of the opposite ear in similar fashion. The patient tolerated procedure well.\par  \par  Findings: \par  Bilateral Ear Canal - normal\par  Bilateral Tympanic Membrane - normal\par  \par  Recommendations: Debrox\par  Complications: None\par  \par

## 2024-03-19 NOTE — ASSESSMENT
[FreeTextEntry1] : 42-year-old gentleman who is well-known to me for ear issues in the past including otitis externa and cerumen.  Today presents with a recent diagnosis of otitis media as well as asymmetric hearing loss by an outside ENT.  Exam today is normal.  Patient feels that he is back to his baseline.  Today we reviewed the audiogram from the outside ENT and there was evidence of a slight asymmetry.  At this time I am recommending a repeat audiogram and tympanogram as well as ABR to rule out a retrocochlear process.  Patient will have this completed and then follow-up after to review those results.  3/19/24: Patient presents for routine ear cleaning. On exam there is evidence of cerumen impaction. This was cleaned today without issue. For slight asymmetry I am recommending a repeat audiogram and tympanogram as well as ABR to rule out a retrocochlear process. Follow up after for review.    - Audiogram, tympanogram, ABR - Follow-up after the above, sooner should symptoms worsen or fail to improve

## 2024-03-19 NOTE — HISTORY OF PRESENT ILLNESS
CASE MANAGEMENT DISCHARGE SUMMARY
 
 
PATIENT: BROOK CABELLO                       UNIT: [id number]
ACCOUNT#: [id number]                       ADM DATE: 19
AGE: 69     : [birth year]  SEX: M            ROOM/BED: D.2114    
AUTHOR: WILDADOC                             PHYSICIAN:                               
 
REFERRING PHYSICIAN: VANDANA MATHEW MD               
DATE OF SERVICE: 19
Discharge Plan
 
 
Patient Name: BROOK CABELLO
Facility: [facility]
Encounter #: [id number]
Medical Record #: [id number]
: 1949
Planned Disposition: Home or Self Care
Anticipated Discharge Date: 
 
Discharge Date: 
Expected LOS: 
Initial Reviewer: [email or web address]
Initial Review Date: 2019
Generated: 19  10:57 am 
Comments
 
DCP- Discharge Planning
 
Updated by [email or web address]: Moises Berg on 19   2:58 pm CT
Patient Name: BROOK ACBELLO                                     
Admission Status: ER   
Accout number: [id number]                              
Admission Date: 2019   
: 1949                                                        
Admission Diagnosis:OTHER SPECIFIED ABNORMAL FINDINGS OF BLOOD CHEMISTRY   
Attending: VANDANA MATHEW                                                
Current LOS:  4   
  
Anticipated DC Date:    
Planned Disposition:    
Primary Insurance: MEDICARE A & B   
  
  
Discharge Planning Comments:   
CM MET WITH PT IN ROOM TO DISCUSS DISCHARGE PLANNING AND NEEDS. PT REPORTS 
LIVING AT HOME INDEPENDENTLY AND ALONE; PT'S SISTER LIVES NEXT DOOR AND 
ASSISTS AS NEEDED AND STAYS THE NIGHT WITH PT IN THE HOME.  PT HAS ROLLING 
WALKER FROM [employer].  PT HAS HOME HEALTH WITH [facility] 
 
NURSING AND PHYSICAL THERAPY.  CM DISCUSSED AVAILABILITY OF HOME HEALTH, 
REHAB SERVICES AND MEDICAL EQUIPMENT. PT DENIES DISCHARGE NEED AND WANTS HIS 
HOME HEALTH RESUMED WHEN HE GOES HOME FROM THE HOSPTIAL.  CONSENT SIGNED.  PT 
REPORTS HE WILL CALL HIS SISTER WHO WILL PICK HIM UP FOR DISCHARGE HOME. 
IMPORTANT MESSAGE FROM MEDICARE PROVIDED AND EXPLAINED.  
  
CM CALLED [facility] IN [place], [phone], SPOKE TO ISSAC WHO 
TOOK REFERRAL INFORMATION AND WILL PLACE PT BACK ON SCHEDULE FOR RESUMPTION 
OF HOME HEALTH CARE AT DISCHARGE.  CM FAXED REFERRAL INFORMATION TO [facility] 
[phone].  
  
FOR DISCHARGE, NOTIFY [facility] IN [place], [phone], FAX 
DISCHARGE INFORMATION TO [facility] [phone].  CM TO FOLLOW AND ASSIST AS 
NEEDED.  
  
  
: Moises Berg
DCP- Discharge Planning
 
Updated by [email or web address]: Moises Berg on 19   1:39 pm CT
Patient Name: BROOK CABELLO                                     
Admission Status: ER   
Accout number: [id number]                              
Admission Date: 2019   
: 1949                                                        
Admission Diagnosis:OTHER SPECIFIED ABNORMAL FINDINGS OF BLOOD CHEMISTRY   
Attending: VANDANA MATHEW                                                
Current LOS:  3   
  
Anticipated DC Date:    
Planned Disposition:    
Primary Insurance: MEDICARE A & B   
  
  
Discharge Planning Comments:   
CM ATTEMPTED TO MEET WITH PT FOR INITIAL ASSESSMENT OF DISCHARGE NEEDS.  PT 
WAS NOT IN ROOM AT APPROXIMATELY 0925 HOURS.  CM TO ATTEMPT ASSESSMENT OF PT 
AT A LATER TIME.  
  
  
: Moises Berg
 DCPIA - Discharge Planning Initial Assessment
 
Updated by [email or web address]: Moises Berg on 19   3:41 pm
*  Is the patient Alert and Oriented?
Yes
*  How many steps to enter\exit or inside your home? NONE *  PCP DR. MICHELLE REEVES *  Pharmacy
[facility] IN [place]
*  Preadmission Environment
Home Alone
*  ADLs
Independent
 
*  Equipment
Rolling Walker
*  Other Equipment
[employer], MEDICAL EQUIPMENT PROVIDER
*  List name and contact numbers for known caregivers / representatives who 
currently or will assist patient after discharge:
MERARI CABELLO, , [phone] [address]  [address]
 
*  Verbal permission to speak to the caregivers and representatives has been 
obtained from the patient.
Yes
*  Community resources currently utilized
Home Health
*  Please name any agencies selected above.
[facility]
*  Additional services required to return to the preadmission environment?
No
*  Can the patient safely return to the preadmission environment?
Yes
*  Has this patient been hospitalized within the prior 30 days at any 
hospital?
No
 
External Providers
External Provider: [facility]
 
Next Contact Date: 2019
Service Request Date: 
Service Type: 
Resolution: 
 
Reviewer: 
Comments: 
 
 
 
 
Coverage Notice
 
Reviewer: [email or web address] - Moises Berg
 
Notice Issued Date-Time: 2019  14:50
Notice Type: Patient Choice Letter
 
Notice Delivered To: Patient
Relationship to Patient: 
Representative Name: 
 
Delivery Method: HAND - Hand Delivered
Marla Days:
Prior Verbal Notification: 
 
 
Recipient Understood Notice: Yes
Recipient Signature: Yes
Med Rec Note Co-signed by Attending:
 
Coverage Notice Comment:  [facility]
Reviewer: [email or web address] - Moises Berg
 
Notice Issued Date-Time: 2019  14:50
Notice Type: IM Discharge Notice
 
Notice Delivered To: Patient
Relationship to Patient: 
Representative Name: 
 
Delivery Method:  - 
Marla Days:
Prior Verbal Notification: 
 
Recipient Understood Notice: Yes
Recipient Signature: Yes
Med Rec Note Co-signed by Attending:
 
Coverage Notice Comment:  
Patient Name: BROOK CABELLO
Encounter #: [id number]
Page [id number]
 
 
 
 
 
Electronically Signed by MARCELLE ASTUDILLO on 19 at 0957
 
 
 
 
 
 
**All edits/amendments must be made on the electronic document**
 
DICTATION DATE: 19     : CANDIE  1957     
RPT#: [id number]                                DC DATE:        
                                               STATUS: ADM IN  
[facility]
[address] [place] [address]
***END OF REPORT*** [de-identified] : The patient is a 38-year-old gentleman who presents with concern for left-sided otalgia and otorrhea.The patient states that his symptoms began one week ago. He progressively has gotten worse. He also noted some muffled hearing. He was ultimately seen that h emergency room on 826 2019. While there he was diagnosed with a left-sided otitis externa. A wick was placed in the EAC. The patient was started on Ciprodex otic solution. He has been using the drops. He does not use Q-tips. He has not been in lakes or pools. nothing like this has happened previously.  The patient otherwise denies any ear, nose, throat symptoms. - uptake 9/11/2019 Since her last visit the patient has been doing remarkably well. He's been using Ciprodex otic solution on the left. He no longer has any pain or itchiness in his ears. He feels that his hearing is back to baseline. He denies any otologic symptoms otherwise. He denies any new ear, nose, throat symptoms. -  Interval History: update 4/25/2022 No significant change in symptoms. He presents today for routine ear cleaning. He notes some minor irritation on the left. No new ear, nose, throat symptoms otherwise.  - 7/13/23 Went to an Urgent Care in [facility] after having some ear fullness.  He was dx with OM, and given abx and steroid.  After treatment symptoms persisted.  Seen by an ENT and given medrol/amoxcillin for 7 days and now feels better.  Had an audio on a repeat  - [FreeTextEntry1] : 3/19/24: Patient presents for routine ear cleaning. He denies any changes in hearing, tinnitus, otalgia, otorrhea, or vertiginous symptoms. +qtips. He did not complete audio/tymps or ABR.

## 2024-03-29 ENCOUNTER — APPOINTMENT (OUTPATIENT)
Dept: OTOLARYNGOLOGY | Facility: CLINIC | Age: 44
End: 2024-03-29